# Patient Record
Sex: FEMALE | Race: WHITE | NOT HISPANIC OR LATINO
[De-identification: names, ages, dates, MRNs, and addresses within clinical notes are randomized per-mention and may not be internally consistent; named-entity substitution may affect disease eponyms.]

---

## 2021-12-09 ENCOUNTER — APPOINTMENT (OUTPATIENT)
Dept: PULMONOLOGY | Facility: CLINIC | Age: 21
End: 2021-12-09
Payer: COMMERCIAL

## 2021-12-09 ENCOUNTER — TRANSCRIPTION ENCOUNTER (OUTPATIENT)
Age: 21
End: 2021-12-09

## 2021-12-09 VITALS
HEIGHT: 66 IN | DIASTOLIC BLOOD PRESSURE: 76 MMHG | BODY MASS INDEX: 24.27 KG/M2 | WEIGHT: 151 LBS | HEART RATE: 80 BPM | RESPIRATION RATE: 17 BRPM | OXYGEN SATURATION: 100 % | SYSTOLIC BLOOD PRESSURE: 120 MMHG | TEMPERATURE: 98.2 F

## 2021-12-09 DIAGNOSIS — Z78.9 OTHER SPECIFIED HEALTH STATUS: ICD-10-CM

## 2021-12-09 DIAGNOSIS — Z83.438 FAMILY HISTORY OF OTHER DISORDER OF LIPOPROTEIN METABOLISM AND OTHER LIPIDEMIA: ICD-10-CM

## 2021-12-09 PROCEDURE — 99203 OFFICE O/P NEW LOW 30 MIN: CPT

## 2021-12-10 ENCOUNTER — APPOINTMENT (OUTPATIENT)
Dept: PULMONOLOGY | Facility: CLINIC | Age: 21
End: 2021-12-10

## 2021-12-10 LAB
RAPID RVP RESULT: NOT DETECTED
SARS-COV-2 RNA PNL RESP NAA+PROBE: NOT DETECTED

## 2021-12-10 NOTE — CURRENT MEDS
[Takes medication as prescribed] : takes [FreeTextEntry1] : Currently only OCPs standing and completing a course of antibiotics

## 2021-12-10 NOTE — HISTORY OF PRESENT ILLNESS
[Never] : never [Difficulty Breathing During Exertion] : dyspnea on exertion [Feelings Of Weakness On Exertion] : exercise intolerance [Wheezing] : wheezing [Nasal Passage Blockage (Stuffiness)] : edema [Nonspecific Pain, Swelling, And Stiffness] : chest pain [Cough] : coughing [0.5  -  Very, very slight] : 0.5, very, very slight [TextBox_4] : 20F generally healthy presenting for initial pulmonary evaluation in the setting of persistent cough and chest tightness.\par \par She noted symptoms of a sinus infection around Thanksgiving and was given a course of antibiotics by her father-in-law who is an NP. She was first given Levaquin for 1 week and now is completing 2 weeks of Augmentin. She states her sinus symptoms have improved but she has now started to notice a cough with intermittent sputum.\par \par She denies hemoptysis. She denies significant wheezing but has had some chest tightness. She has also been given Flonase for this.\par \par She has not had any chest imaging to this point. She is a never smoker. 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

## 2021-12-10 NOTE — REVIEW OF SYSTEMS
[Postnasal Drip] : postnasal drip [Sinus Problems] : sinus problems [Cough] : cough [Fever] : no fever [Fatigue] : no fatigue [Chills] : no chills [Recent Wt Loss (___ Lbs)] : ~T no recent weight loss [Dry Eyes] : no dry eyes [Epistaxis] : no epistaxis [Sore Throat] : no sore throat [Eye Irritation] : no eye irritation [Nasal Congestion] : no nasal congestion [Hemoptysis] : no hemoptysis [Chest Tightness] : no chest tightness [Frequent URIs] : no frequent URIs [Sputum] : no sputum [Dyspnea] : no dyspnea [Wheezing] : no wheezing [SOB on Exertion] : no sob on exertion [Chest Discomfort] : no chest discomfort [Claudication] : no claudication [Edema] : no edema [Leg Cramps] : no leg cramps [Hay Fever] : no hay fever [Watery Eyes] : no watery eyes [Itchy Eyes] : no itchy eyes [GERD] : no gerd [Abdominal Pain] : no abdominal pain [Nausea] : no nausea [Vomiting] : no vomiting [Nocturia] : no nocturia [Dysuria] : no dysuria [Arthralgias] : no arthralgias [Myalgias] : no myalgias [Raynaud] : no raynaud [Anemia] : no anemia [Easy Bruising] : no easy bruising [Headache] : no headache [Focal Weakness] : no focal weakness [Seizures] : no seizures [Dizziness] : no dizziness [Depression] : no depression [Anxiety] : no anxiety [Diabetes] : no diabetes [Thyroid Problem] : no thyroid problem [Obesity] : no obesity

## 2021-12-10 NOTE — ASSESSMENT
[FreeTextEntry1] : 20F no significant past medical history presenting for initial pulmonary evaluation of persistent cough after a recent sinus infection. She denies hemoptysis or dyspnea and is otherwise in her baseline state of health. She is currently completing a course of antibiotics\par \par 1. Cough - likely related to sinus issues and related postnasal drip\par - patient nontoxic appearing and will complete antibiotic course in 2 days\par - will check CXR to ensure no parenchymal lung disease\par - will check RVP to see if related to a viral illness - patient was tested for COVID x 2 and negative during her symptom onset (unclear if PCR vs rapid)\par - referred for spirometry to evaluate for obstructive lung disease or reactive airway disease\par - continue Flonase BID\par - will give a trial of Ventolin PRN to see if this helps symptoms\par \par 2. Health Maintenance\par Spirometry: Referred for spirometry - appointment given for 12/10/21 if COVID able to come back in time\par Smoking status: Never smoker\par Pneumococcal vaccination status: Not applicable\par COVID vaccination status: Completed Pfizer in March - not yet received booster\par [place] Sleepiness Scale: 2 (12/9/21)\par \par 3. Patient will call my office with any questions or concerns\par - Followup based on symptoms\par \par \par The above plan was discussed with JOAN REDMAN  in detail. Patient verbalized understanding and agrees with plan as detailed above. Patient was provided education and counselling on current diagnosis/symptoms, diagnostic work up, treatment options and potential side effects of any prescribed therapy/therapies. JOAN  was advised to call our clinic at [phone] for any new or worsening symptoms, or with any questions or concerns. In case of acute onset of respiratory symptoms or worsening presentation, patient was advised to present to nearest emergency room for further evaluation. JOAN  expressed understanding and all questions/concerns were addressed.\par

## 2021-12-10 NOTE — PHYSICAL EXAM
[No Acute Distress] : no acute distress [Well Nourished] : well nourished [No Deformities] : no deformities [Well Developed] : well developed [Normal Oropharynx] : normal oropharynx [Normal Appearance] : normal appearance [Supple] : supple [No Neck Mass] : no neck mass [No JVD] : no jvd [Normal Rate/Rhythm] : normal rate/rhythm [Normal S1, S2] : normal s1, s2 [No Murmurs] : no murmurs [No Resp Distress] : no resp distress [No Acc Muscle Use] : no acc muscle use [Clear to Auscultation Bilaterally] : clear to auscultation bilaterally [No Abnormalities] : no abnormalities [Benign] : benign [Not Tender] : not tender [Soft] : soft [Normal Gait] : normal gait [No Clubbing] : no clubbing [No Cyanosis] : no cyanosis [No Edema] : no edema [FROM] : FROM [Normal Color/ Pigmentation] : normal color/ pigmentation [No Focal Deficits] : no focal deficits [No Motor Deficits] : no motor deficits [Oriented x3] : oriented x3 [Normal Affect] : normal affect [TextBox_11] : NCAT, EOMI, anicteric, MMM, nares clear, tonsils clear, no thrush, trachea midline

## 2021-12-17 ENCOUNTER — OUTPATIENT (OUTPATIENT)
Dept: OUTPATIENT SERVICES | Facility: HOSPITAL | Age: 21
LOS: 1 days | End: 2021-12-17
Payer: COMMERCIAL

## 2021-12-17 DIAGNOSIS — R05.9 COUGH, UNSPECIFIED: ICD-10-CM

## 2021-12-17 PROCEDURE — 71046 X-RAY EXAM CHEST 2 VIEWS: CPT | Mod: 26

## 2021-12-29 ENCOUNTER — NON-APPOINTMENT (OUTPATIENT)
Age: 21
End: 2021-12-29

## 2022-02-11 RX ORDER — PREDNISONE 20 MG/1
20 TABLET ORAL
Qty: 5 | Refills: 0 | Status: DISCONTINUED | COMMUNITY
Start: 2022-01-05 | End: 2022-02-11

## 2022-02-18 ENCOUNTER — NON-APPOINTMENT (OUTPATIENT)
Age: 22
End: 2022-02-18

## 2022-03-13 ENCOUNTER — TRANSCRIPTION ENCOUNTER (OUTPATIENT)
Age: 22
End: 2022-03-13

## 2022-03-17 ENCOUNTER — APPOINTMENT (OUTPATIENT)
Dept: PULMONOLOGY | Facility: CLINIC | Age: 22
End: 2022-03-17
Payer: COMMERCIAL

## 2022-03-17 VITALS
BODY MASS INDEX: 25.35 KG/M2 | WEIGHT: 154 LBS | SYSTOLIC BLOOD PRESSURE: 110 MMHG | HEART RATE: 72 BPM | DIASTOLIC BLOOD PRESSURE: 72 MMHG | OXYGEN SATURATION: 100 % | TEMPERATURE: 98.6 F | HEIGHT: 65.5 IN

## 2022-03-17 PROCEDURE — 94729 DIFFUSING CAPACITY: CPT

## 2022-03-17 PROCEDURE — 94726 PLETHYSMOGRAPHY LUNG VOLUMES: CPT

## 2022-03-17 PROCEDURE — 94010 BREATHING CAPACITY TEST: CPT

## 2022-03-18 ENCOUNTER — APPOINTMENT (OUTPATIENT)
Dept: PULMONOLOGY | Facility: CLINIC | Age: 22
End: 2022-03-18
Payer: COMMERCIAL

## 2022-03-18 DIAGNOSIS — R09.82 POSTNASAL DRIP: ICD-10-CM

## 2022-03-18 DIAGNOSIS — Z00.00 ENCOUNTER FOR GENERAL ADULT MEDICAL EXAMINATION W/OUT ABNORMAL FINDINGS: ICD-10-CM

## 2022-03-18 DIAGNOSIS — R05.9 COUGH, UNSPECIFIED: ICD-10-CM

## 2022-03-18 PROCEDURE — 99213 OFFICE O/P EST LOW 20 MIN: CPT | Mod: 95

## 2022-03-18 RX ORDER — PREDNISONE 20 MG/1
20 TABLET ORAL
Qty: 7 | Refills: 0 | Status: DISCONTINUED | COMMUNITY
Start: 2022-02-11 | End: 2022-03-18

## 2022-03-18 RX ORDER — FLUTICASONE PROPIONATE 50 UG/1
50 SPRAY, METERED NASAL TWICE DAILY
Qty: 1 | Refills: 1 | Status: DISCONTINUED | COMMUNITY
Start: 2021-12-09 | End: 2022-03-18

## 2022-03-18 RX ORDER — ALBUTEROL SULFATE 90 UG/1
108 (90 BASE) AEROSOL, METERED RESPIRATORY (INHALATION)
Qty: 1 | Refills: 2 | Status: DISCONTINUED | COMMUNITY
Start: 2021-12-09 | End: 2022-03-18

## 2022-03-18 RX ORDER — FLUTICASONE FUROATE AND VILANTEROL TRIFENATATE 100; 25 UG/1; UG/1
100-25 POWDER RESPIRATORY (INHALATION)
Qty: 1 | Refills: 1 | Status: DISCONTINUED | COMMUNITY
Start: 2022-01-05 | End: 2022-03-18

## 2022-03-18 NOTE — HISTORY OF PRESENT ILLNESS
[Never] : never [Difficulty Breathing During Exertion] : dyspnea on exertion [Feelings Of Weakness On Exertion] : exercise intolerance [Wheezing] : wheezing [Nasal Passage Blockage (Stuffiness)] : edema [Nonspecific Pain, Swelling, And Stiffness] : chest pain [Cough] : coughing [0.5  -  Very, very slight] : 0.5, very, very slight [TextBox_4] : 21F generally healthy presenting for followup pulmonary evaluation in the setting of persistent cough.\par \par - No improvement with Breo\par - PFTs done yesterday - without signs of obstructive or restrictive disease\par - CT chest unremarkable - done at [facility]\par - Cough mostly notable in the AM - from 9-12pm. Has not noted whether no present when away from house\par - Did see an ENT in December - and was noted to have nasopharyngeal mucus\par - Has attempted Prilosec previously - unclear benefit. Has not noted \par - Continues Flonase once daily\par \par PFTs: 3/17/22 - FEV1 3.53L (103%), FVC 4.43L (112%), FEV1/FVC 80%, FEF 25-75 91%, %, ERV 98%, RV/TLC 26%, DLCO 92%\par \par She noted symptoms of a sinus infection around [facility] and was given a course of antibiotics by her mother-in-law who is an NP. She was first given Levaquin for 1 week and now is completing 2 weeks of Augmentin. She states her sinus symptoms have improved but she has now started to notice a cough with intermittent sputum.\par \par She denies hemoptysis. She denies significant wheezing but has had some chest tightness. She has also been given Flonase for this.\par \par She has not had any chest imaging to this point. She is a never smoker. 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 There is no peribronchiolar thickening or bronchiectasis. No infiltrates masses or nodules are seen. IMPRESSION: Unremarkable CT scan of the chest without contrast.

## 2022-03-18 NOTE — REASON FOR VISIT
[Home] : at home, [unfilled] , at the time of the visit. [Medical Office: ([facility])___] : at the medical office located in  [Verbal consent obtained from patient] : the patient, [unfilled] [Follow-Up] : a follow-up visit [Cough] : cough

## 2022-03-18 NOTE — REVIEW OF SYSTEMS
[Fever] : no fever [EDS] : no EDS [Fatigue] : no fatigue [Chills] : no chills [Poor Appetite] : no poor appetite [Dry Eyes] : no dry eyes [Nasal Congestion] : nasal congestion [Postnasal Drip] : postnasal drip [Sinus Problems] : sinus problems [Cough] : cough [Frequent URIs] : no frequent URIs [Sputum] : sputum [Dyspnea] : no dyspnea [Wheezing] : no wheezing [SOB on Exertion] : no sob on exertion [Chest Discomfort] : no chest discomfort [Claudication] : no claudication [Edema] : no edema [Leg Cramps] : no leg cramps [Hay Fever] : no hay fever [Watery Eyes] : no watery eyes [Itchy Eyes] : no itchy eyes [Seasonal Allergies] : no seasonal allergies [Abdominal Pain] : no abdominal pain [Dysuria] : no dysuria [Frequency] : no frequency [Urgency] : no urgency [Arthralgias] : no arthralgias [Myalgias] : no myalgias [Back Pain] : no back pain [Fracture] : no fracture [Rash] : no rash [Easy Bruising] : no easy bruising [Headache] : no headache [Focal Weakness] : no focal weakness [Seizures] : no seizures [Head Injury] : no head injury [Depression] : no depression [Anxiety] : no anxiety [Diabetes] : no diabetes [Thyroid Problem] : no thyroid problem [Obesity] : no obesity [TextBox_144] : thyroid nodules

## 2022-03-18 NOTE — ASSESSMENT
[FreeTextEntry1] : 21F no significant past medical history presenting for pulmonary evaluation of persistent cough after a recent sinus infection. She denies hemoptysis or dyspnea and is otherwise in her baseline state of health. \par \par Telehealth visit done today via [employer].\par \par 1. Cough - likely related to sinus issues and related postnasal drip\par - patient nontoxic appearing\par - will change Flonase to Astelin and suggest ENT followup\par - lung imaging: CXR and CT chest without evidence of parenchymal disease\par - PFTs normal without evidence of obstruction or restriction - will stop bronchodilators as they have not provided benefit\par - GI evaluation for possible GERD. Trial of Famotidine and dietary modification\par \par 2. Health Maintenance\par Spirometry: spirometry\par Smoking status: Never smoker\par Pneumococcal vaccination status: Not applicable\par COVID vaccination status: Completed Pfizer in March - not yet received booster\par [place] Sleepiness Scale: 2 (12/9/21)\par \par 3. Patient will call my office or email me with any questions or concerns\par - Followup based on symptoms\par \par \par The above plan was discussed with JOAN REDMAN  in detail. Patient verbalized understanding and agrees with plan as detailed above. Patient was provided education and counselling on current diagnosis/symptoms, diagnostic work up, treatment options and potential side effects of any prescribed therapy/therapies. JOAN  was advised to call our clinic at [phone] for any new or worsening symptoms, or with any questions or concerns. In case of acute onset of respiratory symptoms or worsening presentation, patient was advised to present to nearest emergency room for further evaluation. JOAN  expressed understanding and all questions/concerns were addressed.\par

## 2022-03-18 NOTE — PHYSICAL EXAM
[No Acute Distress] : no acute distress [Well Nourished] : well nourished [Well Groomed] : well groomed [No Deformities] : no deformities [Well Developed] : well developed [Normal Oropharynx] : normal oropharynx [Normal Appearance] : normal appearance [No Resp Distress] : no resp distress [Oriented x3] : oriented x3 [Normal Affect] : normal affect

## 2022-04-12 ENCOUNTER — TRANSCRIPTION ENCOUNTER (OUTPATIENT)
Age: 22
End: 2022-04-12

## 2023-03-29 ENCOUNTER — NON-APPOINTMENT (OUTPATIENT)
Age: 23
End: 2023-03-29

## 2023-03-29 ENCOUNTER — APPOINTMENT (OUTPATIENT)
Dept: ENDOCRINOLOGY | Facility: CLINIC | Age: 23
End: 2023-03-29
Payer: COMMERCIAL

## 2023-03-29 VITALS
WEIGHT: 156 LBS | HEART RATE: 87 BPM | OXYGEN SATURATION: 99 % | BODY MASS INDEX: 25.68 KG/M2 | DIASTOLIC BLOOD PRESSURE: 70 MMHG | HEIGHT: 65.5 IN | SYSTOLIC BLOOD PRESSURE: 104 MMHG

## 2023-03-29 DIAGNOSIS — E04.0 NONTOXIC DIFFUSE GOITER: ICD-10-CM

## 2023-03-29 PROCEDURE — 99204 OFFICE O/P NEW MOD 45 MIN: CPT

## 2023-03-29 NOTE — ASSESSMENT
[FreeTextEntry1] : 1. Thyroid Nodule vs Parathyroid Adenoma\par Goiter since September 2021, per patient ultrasound concerning for thyroid nodule versus parathyroid adenoma, has never had FNA.  No specific symptoms of thyroid dysfunction or hyperparathyroidism.\par –Check TSH, free T4, thyroid antibodies\par – Check thyroid ultrasound, further plan regarding FNA versus surveillance after results\par – Check CMP, PTH, phosphorus, 25 OH vitamin D\par – She will send us previous thyroid ultrasound report/blood work, email address provided\par \par 2. Vitamin D deficiency\par Currently taking vitamin D supplements, unclear dose, takes 1 tablet daily.\par – Check 25 OH vitamin D level\par \par RTC in 3 months.\par \par Zen Henriquez MD\par [facility]\par Endocrinology at [place] \par [address] [place] [address], [address]\par Ph: [phone]\par Fax: [phone]\par

## 2023-03-30 ENCOUNTER — NON-APPOINTMENT (OUTPATIENT)
Age: 23
End: 2023-03-30

## 2023-03-30 LAB
25(OH)D3 SERPL-MCNC: 32.2 NG/ML
ALBUMIN SERPL ELPH-MCNC: 4.4 G/DL
ALP BLD-CCNC: 58 U/L
ALT SERPL-CCNC: 23 U/L
ANION GAP SERPL CALC-SCNC: 12 MMOL/L
AST SERPL-CCNC: 20 U/L
BILIRUB SERPL-MCNC: 0.3 MG/DL
BUN SERPL-MCNC: 12 MG/DL
CALCIUM SERPL-MCNC: 10.1 MG/DL
CALCIUM SERPL-MCNC: 10.1 MG/DL
CHLORIDE SERPL-SCNC: 102 MMOL/L
CO2 SERPL-SCNC: 25 MMOL/L
CREAT SERPL-MCNC: 0.74 MG/DL
EGFR: 116 ML/MIN/1.73M2
GLUCOSE SERPL-MCNC: 88 MG/DL
PARATHYROID HORMONE INTACT: 20 PG/ML
PHOSPHATE SERPL-MCNC: 4 MG/DL
POTASSIUM SERPL-SCNC: 4.7 MMOL/L
PROT SERPL-MCNC: 7.5 G/DL
SODIUM SERPL-SCNC: 139 MMOL/L
T4 FREE SERPL-MCNC: 1.2 NG/DL
THYROGLOB AB SERPL-ACNC: 107 IU/ML
THYROPEROXIDASE AB SERPL IA-ACNC: 104 IU/ML
TSH SERPL-ACNC: 2.47 UIU/ML

## 2023-04-05 ENCOUNTER — TRANSCRIPTION ENCOUNTER (OUTPATIENT)
Age: 23
End: 2023-04-05

## 2023-04-29 ENCOUNTER — APPOINTMENT (OUTPATIENT)
Dept: ULTRASOUND IMAGING | Facility: CLINIC | Age: 23
End: 2023-04-29
Payer: COMMERCIAL

## 2023-04-29 ENCOUNTER — OUTPATIENT (OUTPATIENT)
Dept: OUTPATIENT SERVICES | Facility: HOSPITAL | Age: 23
LOS: 1 days | End: 2023-04-29
Payer: COMMERCIAL

## 2023-04-29 DIAGNOSIS — E04.0 NONTOXIC DIFFUSE GOITER: ICD-10-CM

## 2023-04-29 PROCEDURE — 76536 US EXAM OF HEAD AND NECK: CPT | Mod: 26

## 2023-04-29 PROCEDURE — 76536 US EXAM OF HEAD AND NECK: CPT

## 2023-05-26 ENCOUNTER — APPOINTMENT (OUTPATIENT)
Dept: ENDOCRINOLOGY | Facility: CLINIC | Age: 23
End: 2023-05-26

## 2023-09-07 ENCOUNTER — APPOINTMENT (OUTPATIENT)
Age: 23
End: 2023-09-07
Payer: COMMERCIAL

## 2023-09-07 PROCEDURE — D0220: CPT

## 2023-09-07 PROCEDURE — D1110 PROPHYLAXIS - ADULT: CPT

## 2023-09-07 PROCEDURE — D0274: CPT

## 2023-09-07 PROCEDURE — D0230: CPT

## 2023-09-08 ENCOUNTER — NON-APPOINTMENT (OUTPATIENT)
Age: 23
End: 2023-09-08

## 2023-09-08 ENCOUNTER — APPOINTMENT (OUTPATIENT)
Dept: INTERNAL MEDICINE | Facility: CLINIC | Age: 23
End: 2023-09-08
Payer: COMMERCIAL

## 2023-09-08 VITALS
HEIGHT: 65.5 IN | SYSTOLIC BLOOD PRESSURE: 120 MMHG | WEIGHT: 157 LBS | DIASTOLIC BLOOD PRESSURE: 80 MMHG | BODY MASS INDEX: 25.84 KG/M2

## 2023-09-08 DIAGNOSIS — R79.9 ABNORMAL FINDING OF BLOOD CHEMISTRY, UNSPECIFIED: ICD-10-CM

## 2023-09-08 DIAGNOSIS — E55.9 VITAMIN D DEFICIENCY, UNSPECIFIED: ICD-10-CM

## 2023-09-08 DIAGNOSIS — Z00.00 ENCOUNTER FOR GENERAL ADULT MEDICAL EXAMINATION W/OUT ABNORMAL FINDINGS: ICD-10-CM

## 2023-09-08 PROCEDURE — 36415 COLL VENOUS BLD VENIPUNCTURE: CPT

## 2023-09-08 PROCEDURE — 99385 PREV VISIT NEW AGE 18-39: CPT | Mod: 25

## 2023-09-08 RX ORDER — FAMOTIDINE 20 MG/1
20 TABLET, FILM COATED ORAL DAILY
Qty: 30 | Refills: 2 | Status: COMPLETED | COMMUNITY
Start: 2022-03-18 | End: 2023-09-08

## 2023-09-08 RX ORDER — AZELASTINE HYDROCHLORIDE 137 UG/1
137 SPRAY, METERED NASAL TWICE DAILY
Qty: 1 | Refills: 1 | Status: COMPLETED | COMMUNITY
Start: 2022-03-18 | End: 2023-09-08

## 2023-09-08 NOTE — HISTORY OF PRESENT ILLNESS
[FreeTextEntry1] : physical exam  [de-identified] : Ms. JOAN REDMAN is a 22 year female comes to the office for physical exam. Patient denies fever, cough SOB. No other complaints at this time.

## 2023-09-08 NOTE — HEALTH RISK ASSESSMENT
[Yes] : Yes [Monthly or less (1 pt)] : Monthly or less (1 point) [1 or 2 (0 pts)] : 1 or 2 (0 points) [Never (0 pts)] : Never (0 points) [No] : In the past 12 months have you used drugs other than those required for medical reasons? No [0] : 2) Feeling down, depressed, or hopeless: Not at all (0) [PHQ-2 Negative - No further assessment needed] : PHQ-2 Negative - No further assessment needed [Audit-CScore] : 1 [[id number]] : 0 [Never] : Never

## 2023-09-08 NOTE — PLAN
[FreeTextEntry1] : In regards to patients Physical exam, routine blood work drawn, will review results with patient.  Thyroid nodule- patient will follow with endocrinologist  Counseling included abnormal lab results, differential diagnoses, treatment options, risks and benefits, lifestyle changes, current condition, medications, and dose adjustments.  The patient was interactive, attentive, asked questions, and verbalized understanding

## 2023-09-11 LAB
25(OH)D3 SERPL-MCNC: 31.2 NG/ML
ALBUMIN SERPL ELPH-MCNC: 4.7 G/DL
ALP BLD-CCNC: 56 U/L
ALT SERPL-CCNC: 17 U/L
ANION GAP SERPL CALC-SCNC: 11 MMOL/L
AST SERPL-CCNC: 19 U/L
BASOPHILS # BLD AUTO: 0.03 K/UL
BASOPHILS NFR BLD AUTO: 0.5 %
BILIRUB SERPL-MCNC: 0.5 MG/DL
BUN SERPL-MCNC: 10 MG/DL
CALCIUM SERPL-MCNC: 9.9 MG/DL
CHLORIDE SERPL-SCNC: 102 MMOL/L
CHOLEST SERPL-MCNC: 204 MG/DL
CO2 SERPL-SCNC: 25 MMOL/L
CREAT SERPL-MCNC: 0.72 MG/DL
EGFR: 120 ML/MIN/1.73M2
EOSINOPHIL # BLD AUTO: 0.05 K/UL
EOSINOPHIL NFR BLD AUTO: 0.8 %
ESTIMATED AVERAGE GLUCOSE: 100 MG/DL
FOLATE SERPL-MCNC: >20 NG/ML
GLUCOSE SERPL-MCNC: 85 MG/DL
HBA1C MFR BLD HPLC: 5.1 %
HCT VFR BLD CALC: 43.3 %
HDLC SERPL-MCNC: 71 MG/DL
HGB BLD-MCNC: 14.1 G/DL
IMM GRANULOCYTES NFR BLD AUTO: 0.2 %
IRON SATN MFR SERPL: 38 %
IRON SERPL-MCNC: 133 UG/DL
LDLC SERPL CALC-MCNC: 121 MG/DL
LYMPHOCYTES # BLD AUTO: 1.96 K/UL
LYMPHOCYTES NFR BLD AUTO: 31.5 %
MAGNESIUM SERPL-MCNC: 2 MG/DL
MAN DIFF?: NORMAL
MCHC RBC-ENTMCNC: 30.7 PG
MCHC RBC-ENTMCNC: 32.6 GM/DL
MCV RBC AUTO: 94.3 FL
MONOCYTES # BLD AUTO: 0.6 K/UL
MONOCYTES NFR BLD AUTO: 9.6 %
NEUTROPHILS # BLD AUTO: 3.58 K/UL
NEUTROPHILS NFR BLD AUTO: 57.4 %
NONHDLC SERPL-MCNC: 133 MG/DL
PLATELET # BLD AUTO: 278 K/UL
POTASSIUM SERPL-SCNC: 4.5 MMOL/L
PROT SERPL-MCNC: 7.6 G/DL
RBC # BLD: 4.59 M/UL
RBC # FLD: 11.9 %
SODIUM SERPL-SCNC: 137 MMOL/L
TIBC SERPL-MCNC: 354 UG/DL
TRIGL SERPL-MCNC: 62 MG/DL
TSH SERPL-ACNC: 2.31 UIU/ML
UIBC SERPL-MCNC: 221 UG/DL
VIT B12 SERPL-MCNC: >2000 PG/ML
WBC # FLD AUTO: 6.23 K/UL

## 2023-09-16 NOTE — HISTORY OF PRESENT ILLNESS
[FreeTextEntry1] : CHIEF COMPLAINT: Goiter\par \par REFERRED BY: PCP Dr. Vianey Stafford\par \par Available prior medical records reviewed. \par \par HISTORY OF PRESENTING ILLNESS:\par The patient is a 22 year old F being seen in the office today for evaluation of goiter/thyroid nodules.\par First noted to have a goiter in September 2021 by PCP.\par She has not brought previous ultrasounds for my review, will send to the office later.\par Per patient: (Unable to verify at this time)\par Neck ultrasound October 2021: Several small thyroid nodules\par Neck ultrasound April 2022: 1 thyroid nodule\par Neck ultrasound November 2022: 1 thyroid nodule, concern for left parathyroid adenoma\par \par She reports that her goiter may have gotten bigger subjectively.  Reports that she can feel it while swallowing and breathing.  Occasionally has left-sided neck pain in the region of the goiter.  Notes occasional palpitations, heart rate on Apple Watch is . \par \par No constipation or diarrhea. No weight gain or weight loss. No heat or cold intolerance. No fatigue.\par No tremors. No anxiety or depression. No skin or hair changes. No facial or peripheral edema.\par No abdominal pain no bone pains, no psychiatric diagnoses.\par Family history: Sister with goiter.\par No history of biotin intake. 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 
BACK OF HEAD/lacerations

## 2023-11-27 LAB
25(OH)D3 SERPL-MCNC: 26.1 NG/ML
ANION GAP SERPL CALC-SCNC: 12 MMOL/L
BUN SERPL-MCNC: 11 MG/DL
CALCIUM SERPL-MCNC: 9.5 MG/DL
CALCIUM SERPL-MCNC: 9.5 MG/DL
CHLORIDE SERPL-SCNC: 101 MMOL/L
CO2 SERPL-SCNC: 26 MMOL/L
CREAT SERPL-MCNC: 0.86 MG/DL
EGFR: 97 ML/MIN/1.73M2
GLUCOSE SERPL-MCNC: 102 MG/DL
PARATHYROID HORMONE INTACT: 40 PG/ML
POTASSIUM SERPL-SCNC: 4.3 MMOL/L
SODIUM SERPL-SCNC: 139 MMOL/L
T4 FREE SERPL-MCNC: 1.1 NG/DL
TSH SERPL-ACNC: 1.92 UIU/ML

## 2023-12-05 ENCOUNTER — APPOINTMENT (OUTPATIENT)
Dept: ENDOCRINOLOGY | Facility: CLINIC | Age: 23
End: 2023-12-05
Payer: COMMERCIAL

## 2023-12-05 VITALS
OXYGEN SATURATION: 98 % | SYSTOLIC BLOOD PRESSURE: 120 MMHG | HEIGHT: 65.5 IN | WEIGHT: 155 LBS | DIASTOLIC BLOOD PRESSURE: 82 MMHG | HEART RATE: 78 BPM | BODY MASS INDEX: 25.52 KG/M2

## 2023-12-05 DIAGNOSIS — E06.3 AUTOIMMUNE THYROIDITIS: ICD-10-CM

## 2023-12-05 DIAGNOSIS — D35.1 BENIGN NEOPLASM OF PARATHYROID GLAND: ICD-10-CM

## 2023-12-05 DIAGNOSIS — E04.1 NONTOXIC SINGLE THYROID NODULE: ICD-10-CM

## 2023-12-05 PROCEDURE — 99214 OFFICE O/P EST MOD 30 MIN: CPT

## 2024-08-21 ENCOUNTER — APPOINTMENT (OUTPATIENT)
Age: 24
End: 2024-08-21
Payer: COMMERCIAL

## 2024-08-21 PROCEDURE — D1110 PROPHYLAXIS - ADULT: CPT

## 2024-08-21 PROCEDURE — D0274: CPT

## 2024-09-12 ENCOUNTER — APPOINTMENT (OUTPATIENT)
Age: 24
End: 2024-09-12
Payer: COMMERCIAL

## 2024-09-12 PROCEDURE — D0120: CPT

## 2024-10-18 ENCOUNTER — APPOINTMENT (OUTPATIENT)
Dept: INTERNAL MEDICINE | Facility: CLINIC | Age: 24
End: 2024-10-18
Payer: COMMERCIAL

## 2024-10-18 VITALS
HEIGHT: 65.5 IN | DIASTOLIC BLOOD PRESSURE: 87 MMHG | HEART RATE: 66 BPM | BODY MASS INDEX: 26.01 KG/M2 | WEIGHT: 158 LBS | SYSTOLIC BLOOD PRESSURE: 113 MMHG

## 2024-10-18 DIAGNOSIS — R79.9 ABNORMAL FINDING OF BLOOD CHEMISTRY, UNSPECIFIED: ICD-10-CM

## 2024-10-18 DIAGNOSIS — Z23 ENCOUNTER FOR IMMUNIZATION: ICD-10-CM

## 2024-10-18 DIAGNOSIS — E06.3 AUTOIMMUNE THYROIDITIS: ICD-10-CM

## 2024-10-18 DIAGNOSIS — Z00.00 ENCOUNTER FOR GENERAL ADULT MEDICAL EXAMINATION W/OUT ABNORMAL FINDINGS: ICD-10-CM

## 2024-10-18 PROCEDURE — G0010: CPT

## 2024-10-18 PROCEDURE — 36415 COLL VENOUS BLD VENIPUNCTURE: CPT

## 2024-10-18 PROCEDURE — 90739 HEPB VACC 2/4 DOSE ADULT IM: CPT

## 2024-10-18 PROCEDURE — 99395 PREV VISIT EST AGE 18-39: CPT | Mod: 25

## 2024-10-21 LAB
ALBUMIN SERPL ELPH-MCNC: 4.6 G/DL
ALP BLD-CCNC: 50 U/L
ALT SERPL-CCNC: 10 U/L
ANION GAP SERPL CALC-SCNC: 12 MMOL/L
AST SERPL-CCNC: 15 U/L
BASOPHILS # BLD AUTO: 0.04 K/UL
BASOPHILS NFR BLD AUTO: 0.8 %
BILIRUB SERPL-MCNC: 0.4 MG/DL
BUN SERPL-MCNC: 14 MG/DL
CALCIUM SERPL-MCNC: 9.5 MG/DL
CHLORIDE SERPL-SCNC: 100 MMOL/L
CHOLEST SERPL-MCNC: 227 MG/DL
CO2 SERPL-SCNC: 26 MMOL/L
CREAT SERPL-MCNC: 0.76 MG/DL
EGFR: 111 ML/MIN/1.73M2
EOSINOPHIL # BLD AUTO: 0.07 K/UL
EOSINOPHIL NFR BLD AUTO: 1.4 %
ESTIMATED AVERAGE GLUCOSE: 100 MG/DL
GLUCOSE SERPL-MCNC: 80 MG/DL
HBA1C MFR BLD HPLC: 5.1 %
HCT VFR BLD CALC: 40.4 %
HDLC SERPL-MCNC: 73 MG/DL
HGB BLD-MCNC: 12.9 G/DL
IMM GRANULOCYTES NFR BLD AUTO: 0.2 %
LDLC SERPL CALC-MCNC: 142 MG/DL
LYMPHOCYTES # BLD AUTO: 2.08 K/UL
LYMPHOCYTES NFR BLD AUTO: 41.4 %
MAN DIFF?: NORMAL
MCHC RBC-ENTMCNC: 29.9 PG
MCHC RBC-ENTMCNC: 31.9 GM/DL
MCV RBC AUTO: 93.5 FL
MONOCYTES # BLD AUTO: 0.5 K/UL
MONOCYTES NFR BLD AUTO: 9.9 %
NEUTROPHILS # BLD AUTO: 2.33 K/UL
NEUTROPHILS NFR BLD AUTO: 46.3 %
NONHDLC SERPL-MCNC: 154 MG/DL
PLATELET # BLD AUTO: 270 K/UL
POTASSIUM SERPL-SCNC: 4.3 MMOL/L
PROT SERPL-MCNC: 8 G/DL
RBC # BLD: 4.32 M/UL
RBC # FLD: 12.5 %
SODIUM SERPL-SCNC: 139 MMOL/L
TRIGL SERPL-MCNC: 70 MG/DL
TSH SERPL-ACNC: 3.05 UIU/ML
WBC # FLD AUTO: 5.03 K/UL

## 2024-11-26 ENCOUNTER — APPOINTMENT (OUTPATIENT)
Dept: INTERNAL MEDICINE | Facility: CLINIC | Age: 24
End: 2024-11-26
Payer: COMMERCIAL

## 2024-11-26 PROCEDURE — 90739 HEPB VACC 2/4 DOSE ADULT IM: CPT

## 2024-11-26 PROCEDURE — G0010: CPT

## 2025-04-29 ENCOUNTER — APPOINTMENT (OUTPATIENT)
Dept: DERMATOLOGY | Facility: CLINIC | Age: 25
End: 2025-04-29

## 2025-09-18 ENCOUNTER — APPOINTMENT (OUTPATIENT)
Dept: ENDOCRINOLOGY | Facility: CLINIC | Age: 25
End: 2025-09-18